# Patient Record
Sex: FEMALE | Race: WHITE | NOT HISPANIC OR LATINO | Employment: FULL TIME | URBAN - METROPOLITAN AREA
[De-identification: names, ages, dates, MRNs, and addresses within clinical notes are randomized per-mention and may not be internally consistent; named-entity substitution may affect disease eponyms.]

---

## 2017-09-06 ENCOUNTER — HOSPITAL ENCOUNTER (EMERGENCY)
Facility: HOSPITAL | Age: 37
Discharge: HOME | End: 2017-09-06
Attending: EMERGENCY MEDICINE | Admitting: EMERGENCY MEDICINE

## 2017-09-06 VITALS
RESPIRATION RATE: 18 BRPM | TEMPERATURE: 97.5 F | SYSTOLIC BLOOD PRESSURE: 121 MMHG | WEIGHT: 138 LBS | OXYGEN SATURATION: 100 % | HEART RATE: 69 BPM | HEIGHT: 65 IN | BODY MASS INDEX: 22.99 KG/M2 | DIASTOLIC BLOOD PRESSURE: 78 MMHG

## 2017-09-06 DIAGNOSIS — K08.89 PAIN, DENTAL: Primary | ICD-10-CM

## 2017-09-06 PROCEDURE — 99283 EMERGENCY DEPT VISIT LOW MDM: CPT

## 2017-09-06 RX ORDER — LETROZOLE 2.5 MG/1
5 TABLET, FILM COATED ORAL DAILY
COMMUNITY

## 2017-09-06 RX ORDER — AMOXICILLIN 500 MG/1
500 CAPSULE ORAL 3 TIMES DAILY
Qty: 30 CAPSULE | Refills: 0 | Status: SHIPPED | OUTPATIENT
Start: 2017-09-06

## 2017-09-06 RX ORDER — HYDROCODONE BITARTRATE AND ACETAMINOPHEN 5; 325 MG/1; MG/1
1 TABLET ORAL EVERY 6 HOURS PRN
Qty: 12 TABLET | Refills: 0 | Status: SHIPPED | OUTPATIENT
Start: 2017-09-06

## 2017-09-06 RX ORDER — HYDROCODONE BITARTRATE AND ACETAMINOPHEN 7.5; 325 MG/1; MG/1
1 TABLET ORAL ONCE
Status: COMPLETED | OUTPATIENT
Start: 2017-09-06 | End: 2017-09-06

## 2017-09-06 RX ADMIN — HYDROCODONE BITARTRATE AND ACETAMINOPHEN 1 TABLET: 7.5; 325 TABLET ORAL at 03:46

## 2017-09-06 NOTE — DISCHARGE INSTRUCTIONS
Home, rest, medicine as directed, home medicine as prescribed, follow up with dentist for recheck. Return to care with further concerns.

## 2017-09-06 NOTE — ED PROVIDER NOTES
EMERGENCY DEPARTMENT ENCOUNTER    CHIEF COMPLAINT  Chief Complaint: dental pain  History given by: patient  History limited by: nothing  Room Number: 04/04  PMD: No Known Provider      HPI:  Pt is a 36 y.o. female who presents complaining of intermittent right upper dental pain, which woke her up from sleep this morning. Pt states that the pain radiated from her tooth to her right jaw and head. Pt states that she used Oragel, Listerine and attempted gargling salt water without relief of her pain. Pt denies facial swelling, fever, chills but complains of nausea. Pt states that she does have a history of migraines but that her symptoms are different than her prior migraines. Pt states that she has chronic right upper dental sensitivity, which her dentist has previously thought was due to sinus issues.    Duration:  Several hours  Onset: sudden  Timing: intermittent  Location: right upper teeth  Radiation: right jaw, head  Quality: sharp  Intensity/Severity: severe at maximum, now mild to moderate  Progression: unchanged  Associated Symptoms: nausea  Aggravating Factors: none  Alleviating Factors: none  Previous Episodes: Pt states that she has chronic right upper dental sensitivity, which her dentist has previously thought was due to sinus issues.  Treatment before arrival: Pt states that she used Oragel, Listerine and attempted gargling salt water without relief of her pain.    PAST MEDICAL HISTORY  Active Ambulatory Problems     Diagnosis Date Noted   • No Active Ambulatory Problems     Resolved Ambulatory Problems     Diagnosis Date Noted   • No Resolved Ambulatory Problems     Past Medical History:   Diagnosis Date   • Antiphospholipid syndrome    • APS (autoimmune polyglandular syndrome)        PAST SURGICAL HISTORY  History reviewed. No pertinent surgical history.    FAMILY HISTORY  Family History   Problem Relation Age of Onset   • No Known Problems Mother    • Heart disease Father    • Thyroid disease Sister       hashimotos   • Endometriosis Sister    • Cancer Maternal Grandmother      breast       SOCIAL HISTORY  Social History     Social History   • Marital status:      Spouse name: N/A   • Number of children: N/A   • Years of education: N/A     Occupational History   • Not on file.     Social History Main Topics   • Smoking status: Never Smoker   • Smokeless tobacco: Not on file   • Alcohol use Yes      Comment: occ   • Drug use: No   • Sexual activity: Defer     Other Topics Concern   • Not on file     Social History Narrative   • No narrative on file       ALLERGIES  Review of patient's allergies indicates no known allergies.    REVIEW OF SYSTEMS  Review of Systems   Constitutional: Negative for fever.   HENT: Positive for dental problem (right upper dental pain). Negative for facial swelling and sore throat.    Respiratory: Negative for cough and shortness of breath.    Cardiovascular: Negative for chest pain.   Gastrointestinal: Positive for nausea. Negative for abdominal pain, diarrhea and vomiting.   Musculoskeletal: Negative for neck pain.   Skin: Negative for rash.   Neurological: Negative for weakness, numbness and headaches.       PHYSICAL EXAM  ED Triage Vitals   Temp Heart Rate Resp BP SpO2   09/06/17 0204 09/06/17 0204 09/06/17 0204 09/06/17 0212 09/06/17 0204   97.5 °F (36.4 °C) 69 18 139/90 100 %      Temp src Heart Rate Source Patient Position BP Location FiO2 (%)   09/06/17 0204 09/06/17 0204 -- -- --   Tympanic Monitor          Physical Exam   Constitutional: She is oriented to person, place, and time and well-developed, well-nourished, and in no distress.   HENT:   TMJ joints are non-tender. There is no temporal artery or sinus tenderness. Right upper molar has a filling in place and is tender to palpation but there is no sign of an abscess.   Eyes: EOM are normal.   Neck: Normal range of motion.   Cardiovascular: Normal rate and regular rhythm.    Pulmonary/Chest: Effort normal and breath  sounds normal. No respiratory distress.   Lymphadenopathy:     She has no cervical adenopathy.   Neurological: She is alert and oriented to person, place, and time. She has normal sensation and normal strength.   Skin: Skin is warm and dry.   Nursing note and vitals reviewed.    PROCEDURES  Procedures      PROGRESS AND CONSULTS  ED Course     0304- D/w pt and family that there are no signs of an abscess at this time. Discussed the plan to discharge the pt home with prescriptions for pain medication and abx. I instructed the pt to follow up with her dentist. Pt and family agree with the plan and all questions were addressed.    0332- Ordered Norco for pain.    0348- Discussed the pt's case with Dr. Fields, who agrees with the plan of care.    MEDICAL DECISION MAKING  Results were reviewed/discussed with the patient and they were also made aware of online access. Pt also made aware that follow up with PMD is necessary.     MDM  Number of Diagnoses or Management Options  Pain, dental:      Amount and/or Complexity of Data Reviewed  Obtain history from someone other than the patient: yes (spouse)  Discuss the patient with other providers: yes (D/w Dr. Fields)    Patient Progress  Patient progress: stable         DIAGNOSIS  Final diagnoses:   Pain, dental       DISPOSITION  DISCHARGE    Patient discharged in stable condition.    Reviewed implications of results, diagnosis, meds, responsibility to follow up, warning signs and symptoms of possible worsening, potential complications and reasons to return to ER, including fever, worsening pain or any concerns.    Patient/Family voiced understanding of above instructions.    Discussed plan for discharge, as there is no emergent indication for admission.  Pt/family is agreeable and understands need for follow up and repeat testing.  Pt is aware that discharge does not mean that nothing is wrong but it indicates no emergency is present that requires admission and they must  continue care with follow-up as given below or physician of their choice.     FOLLOW-UP  Your dentist    Schedule an appointment as soon as possible for a visit           Medication List      New Prescriptions          amoxicillin 500 MG capsule   Commonly known as:  AMOXIL   Take 1 capsule by mouth 3 (Three) Times a Day.       HYDROcodone-acetaminophen 5-325 MG per tablet   Commonly known as:  NORCO   Take 1 tablet by mouth Every 6 (Six) Hours As Needed for Moderate Pain .               Latest Documented Vital Signs:  As of 3:59 AM  BP- 121/78 HR- 69 Temp- 97.5 °F (36.4 °C) (Tympanic) O2 sat- 100%    --  Documentation assistance provided by rey Lima for Manoj Jarrett PA-C.  Information recorded by the rey was done at my direction and has been verified and validated by me.     Regina Lima  09/06/17 0348       SABINE Zayas  09/06/17 0359

## 2024-02-21 ENCOUNTER — APPOINTMENT (OUTPATIENT)
Dept: WOMENS IMAGING | Facility: HOSPITAL | Age: 44
End: 2024-02-21
Payer: COMMERCIAL

## 2024-02-21 PROCEDURE — 77065 DX MAMMO INCL CAD UNI: CPT | Performed by: RADIOLOGY

## 2024-02-21 PROCEDURE — G0279 TOMOSYNTHESIS, MAMMO: HCPCS | Performed by: RADIOLOGY

## 2024-02-21 PROCEDURE — 76642 ULTRASOUND BREAST LIMITED: CPT | Performed by: RADIOLOGY

## 2024-02-21 PROCEDURE — 77061 BREAST TOMOSYNTHESIS UNI: CPT | Performed by: RADIOLOGY
